# Patient Record
Sex: MALE | Race: BLACK OR AFRICAN AMERICAN | NOT HISPANIC OR LATINO | Employment: UNEMPLOYED | URBAN - METROPOLITAN AREA
[De-identification: names, ages, dates, MRNs, and addresses within clinical notes are randomized per-mention and may not be internally consistent; named-entity substitution may affect disease eponyms.]

---

## 2023-01-24 ENCOUNTER — HOSPITAL ENCOUNTER (EMERGENCY)
Facility: HOSPITAL | Age: 23
Discharge: HOME | End: 2023-01-24
Attending: EMERGENCY MEDICINE

## 2023-01-24 ENCOUNTER — APPOINTMENT (EMERGENCY)
Dept: RADIOLOGY | Facility: HOSPITAL | Age: 23
End: 2023-01-24

## 2023-01-24 VITALS
TEMPERATURE: 97.6 F | DIASTOLIC BLOOD PRESSURE: 95 MMHG | WEIGHT: 216 LBS | BODY MASS INDEX: 24.99 KG/M2 | SYSTOLIC BLOOD PRESSURE: 140 MMHG | OXYGEN SATURATION: 98 % | RESPIRATION RATE: 20 BRPM | HEIGHT: 78 IN | HEART RATE: 93 BPM

## 2023-01-24 DIAGNOSIS — S91.209A AVULSION OF TOENAIL, INITIAL ENCOUNTER: Primary | ICD-10-CM

## 2023-01-24 RX ORDER — IBUPROFEN 600 MG/1
600 TABLET ORAL ONCE
Status: COMPLETED | OUTPATIENT
Start: 2023-01-24 | End: 2023-01-24

## 2023-01-24 RX ADMIN — IBUPROFEN 600 MG: 600 TABLET, FILM COATED ORAL at 19:52

## 2023-01-25 NOTE — RESULT ENCOUNTER NOTE
No phone number or address on file, unable to contact patient  I discussed the case with Dr Miguel Bauer  He states patient did not have an open wound, just a partial nail avulsion  Patient did not have any bony tenderness

## 2023-01-25 NOTE — ED PROVIDER NOTES
History  Chief Complaint   Patient presents with   • Toe Pain     Patient states"he stubbed his right great toe on the bathroom door"  23year old male with left toe nail avulsion after stubbing it against a door  No pain meds PTA  Worse with palpation  Denies toe pain  None       History reviewed  No pertinent past medical history  History reviewed  No pertinent surgical history  History reviewed  No pertinent family history  I have reviewed and agree with the history as documented  E-Cigarette/Vaping   • E-Cigarette Use Never User      E-Cigarette/Vaping Substances     Social History     Tobacco Use   • Smoking status: Every Day     Packs/day: 1 00     Types: Cigarettes   • Smokeless tobacco: Never   Vaping Use   • Vaping Use: Never used   Substance Use Topics   • Alcohol use: Yes     Comment: social   • Drug use: Never       Review of Systems   Skin: Positive for wound  Physical Exam  Physical Exam  Vitals and nursing note reviewed  Constitutional:       General: He is not in acute distress  Appearance: He is well-developed  HENT:      Head: Normocephalic and atraumatic  Right Ear: External ear normal       Left Ear: External ear normal       Nose: Nose normal    Eyes:      General: No scleral icterus  Pulmonary:      Effort: Pulmonary effort is normal  No respiratory distress  Abdominal:      General: There is no distension  Palpations: Abdomen is soft  Musculoskeletal:         General: Tenderness present  No deformity  Normal range of motion  Cervical back: Normal range of motion and neck supple  Comments: Partial left great toe nail avulsion  Skin:     General: Skin is warm  Findings: No rash  Neurological:      General: No focal deficit present  Mental Status: He is alert        Gait: Gait normal    Psychiatric:         Mood and Affect: Mood normal          Vital Signs  ED Triage Vitals [01/24/23 1901]   Temperature Pulse Respirations Blood Pressure SpO2   97 6 °F (36 4 °C) 93 20 140/95 98 %      Temp src Heart Rate Source Patient Position - Orthostatic VS BP Location FiO2 (%)   -- -- -- -- --      Pain Score       3           Vitals:    01/24/23 1901   BP: 140/95   Pulse: 93         Visual Acuity      ED Medications  Medications   ibuprofen (MOTRIN) tablet 600 mg (600 mg Oral Given 1/24/23 1952)       Diagnostic Studies  Results Reviewed     None                 XR foot 3+ views RIGHT   ED Interpretation by Razia Medellin DO (01/24 1940)   No acute osseous abnormality                 Procedures  Procedures         ED Course                                             Medical Decision Making  23 yom with toe nail avulsion  Avulsion portion shortened with scissors  Thoroughly cleaned out with betadine  XR without acute osseous abnormality  Avulsion of toenail, initial encounter: complicated acute illness or injury  Amount and/or Complexity of Data Reviewed  Radiology: ordered and independent interpretation performed  Risk  Prescription drug management  Disposition  Final diagnoses:   Avulsion of toenail, initial encounter     Time reflects when diagnosis was documented in both MDM as applicable and the Disposition within this note     Time User Action Codes Description Comment    1/24/2023  7:40 PM Norma Tan Add [S91 [id number]] Avulsion of toenail, initial encounter       ED Disposition     ED Disposition   Discharge    Condition   Stable    Date/Time   Tue Jan 24, 2023  7:40 PM    Comment   Kerry Echeverria discharge to home/self care                 Follow-up Information     Follow up With Specialties Details Why Contact Info Additional Information    [facility] Podiatry [facility] In 1 week  [address] [place] [address]  [address] [facility] [id number]  [address], [place] [address] [address] Emergency Department Emergency Medicine  If symptoms worsen [address] [place],[id number]  [address] [facility] Emergency Department, [address] [facility], [place]          There are no discharge medications for this patient  No discharge procedures on file      PDMP Review     None          ED Provider  Electronically Signed by           Ofelia Grigsby DO  01/24/23 5742